# Patient Record
Sex: MALE | Race: WHITE | Employment: OTHER | URBAN - METROPOLITAN AREA
[De-identification: names, ages, dates, MRNs, and addresses within clinical notes are randomized per-mention and may not be internally consistent; named-entity substitution may affect disease eponyms.]

---

## 2017-05-07 ENCOUNTER — APPOINTMENT (OUTPATIENT)
Dept: CT IMAGING | Facility: HOSPITAL | Age: 62
End: 2017-05-07
Attending: EMERGENCY MEDICINE
Payer: COMMERCIAL

## 2017-05-07 ENCOUNTER — HOSPITAL ENCOUNTER (EMERGENCY)
Facility: HOSPITAL | Age: 62
Discharge: HOME | End: 2017-05-07
Attending: EMERGENCY MEDICINE
Payer: COMMERCIAL

## 2017-05-07 VITALS
OXYGEN SATURATION: 98 % | DIASTOLIC BLOOD PRESSURE: 66 MMHG | SYSTOLIC BLOOD PRESSURE: 121 MMHG | HEART RATE: 72 BPM | RESPIRATION RATE: 16 BRPM | WEIGHT: 165 LBS | TEMPERATURE: 98 F | HEIGHT: 66 IN | BODY MASS INDEX: 26.52 KG/M2

## 2017-05-07 DIAGNOSIS — K57.92 ACUTE DIVERTICULITIS: Primary | ICD-10-CM

## 2017-05-07 PROCEDURE — 96360 HYDRATION IV INFUSION INIT: CPT

## 2017-05-07 PROCEDURE — 85025 COMPLETE CBC W/AUTO DIFF WBC: CPT | Performed by: EMERGENCY MEDICINE

## 2017-05-07 PROCEDURE — 80053 COMPREHEN METABOLIC PANEL: CPT | Performed by: EMERGENCY MEDICINE

## 2017-05-07 PROCEDURE — 83690 ASSAY OF LIPASE: CPT | Performed by: EMERGENCY MEDICINE

## 2017-05-07 PROCEDURE — 74177 CT ABD & PELVIS W/CONTRAST: CPT | Performed by: EMERGENCY MEDICINE

## 2017-05-07 PROCEDURE — 96361 HYDRATE IV INFUSION ADD-ON: CPT

## 2017-05-07 PROCEDURE — 99284 EMERGENCY DEPT VISIT MOD MDM: CPT

## 2017-05-07 PROCEDURE — 81001 URINALYSIS AUTO W/SCOPE: CPT | Performed by: EMERGENCY MEDICINE

## 2017-05-07 RX ORDER — LEVOFLOXACIN 750 MG/1
750 TABLET ORAL ONCE
Status: COMPLETED | OUTPATIENT
Start: 2017-05-07 | End: 2017-05-07

## 2017-05-07 RX ORDER — IBUPROFEN 200 MG
200 TABLET ORAL EVERY 6 HOURS PRN
COMMUNITY
End: 2017-05-09

## 2017-05-07 RX ORDER — METRONIDAZOLE 500 MG/1
500 TABLET ORAL 3 TIMES DAILY
Qty: 30 TABLET | Refills: 0 | Status: SHIPPED | OUTPATIENT
Start: 2017-05-07 | End: 2017-05-17

## 2017-05-07 RX ORDER — SODIUM CHLORIDE 9 MG/ML
INJECTION, SOLUTION INTRAVENOUS CONTINUOUS
Status: DISCONTINUED | OUTPATIENT
Start: 2017-05-07 | End: 2017-05-07

## 2017-05-07 RX ORDER — LEVOFLOXACIN 500 MG/1
500 TABLET, FILM COATED ORAL DAILY
Qty: 10 TABLET | Refills: 0 | Status: SHIPPED | OUTPATIENT
Start: 2017-05-07 | End: 2017-05-17

## 2017-05-07 RX ORDER — CHLORAL HYDRATE 500 MG
1000 CAPSULE ORAL DAILY
COMMUNITY

## 2017-05-07 NOTE — ED PROVIDER NOTES
Patient Seen in: [facility] Emergency Department    History   No chief complaint on file. Stated Complaint: abd pain    HPI  Patient is a 77-year-old male who states that over the past 2 days he has had left lower quadrant abdominal pain.   Armani systems reviewed and negative except as noted above. PSFH elements reviewed from today and agreed except as otherwise stated in HPI.     Physical Exam     ED Triage Vitals   BP 05/07/17 1316 157/101 mmHg   Pulse 05/07/17 1316 73   Resp 05/07/17 1316 16 DIFFERENTIAL WITH PLATELET.   Procedure                               Abnormality         Status                     ---------                               -----------         ------                     CBC W/ DIFFERENTIAL[[id number]]          Abnormal

## 2017-05-07 NOTE — ED INITIAL ASSESSMENT (HPI)
Pt came from an Immediate care for further evaluation for abd pain that started 2 days ago. Jesus ferrell.

## 2017-05-07 NOTE — ED NOTES
Pt reevaluated by er physician, informed of all his test reports and plan of care, pt verbalizing understanding. For discharge.

## 2017-05-07 NOTE — PROGRESS NOTES
[facility] Note:  Renal Adjustment for Levaquin (levofloxacin)    David Vigil is a 59year old male who has been prescribed Levaquin (levofloxacin) 500 mg X 1 DOSE. CrCl is estimated creatinine clearance is 82.2 mL/min (based on Cr of 0.83).  so th

## 2017-05-09 PROBLEM — K57.32 DIVERTICULITIS OF LARGE INTESTINE WITHOUT PERFORATION OR ABSCESS WITHOUT BLEEDING: Status: ACTIVE | Noted: 2017-05-09

## 2017-05-10 PROCEDURE — 81001 URINALYSIS AUTO W/SCOPE: CPT | Performed by: INTERNAL MEDICINE

## 2017-05-17 ENCOUNTER — HOSPITAL ENCOUNTER (EMERGENCY)
Facility: HOSPITAL | Age: 62
Discharge: HOME | End: 2017-05-17
Attending: EMERGENCY MEDICINE
Payer: COMMERCIAL

## 2017-05-17 ENCOUNTER — APPOINTMENT (OUTPATIENT)
Dept: ULTRASOUND IMAGING | Facility: HOSPITAL | Age: 62
End: 2017-05-17
Attending: EMERGENCY MEDICINE
Payer: COMMERCIAL

## 2017-05-17 VITALS
RESPIRATION RATE: 16 BRPM | TEMPERATURE: 97 F | OXYGEN SATURATION: 100 % | HEART RATE: 62 BPM | DIASTOLIC BLOOD PRESSURE: 79 MMHG | SYSTOLIC BLOOD PRESSURE: 131 MMHG

## 2017-05-17 DIAGNOSIS — M79.604 PAIN OF RIGHT LOWER EXTREMITY: Primary | ICD-10-CM

## 2017-05-17 PROCEDURE — 99284 EMERGENCY DEPT VISIT MOD MDM: CPT

## 2017-05-17 PROCEDURE — 93971 EXTREMITY STUDY: CPT | Performed by: EMERGENCY MEDICINE

## 2017-05-17 NOTE — ED NOTES
Pt complaints of pain in the right foot for the past few days. Pt reports increase of pain in severity and location spreading up to the knee. Pt has some tingling to the dorsal area and the plantar area.   Pt has slight tenderness to the achilles area with

## 2017-05-18 NOTE — ED NOTES
Pt returned from [address], he is resting in bed. Family at bedside.  Pt in no acute distress

## 2017-05-18 NOTE — ED PROVIDER NOTES
Patient Seen in: [facility] Emergency Department    History   Patient presents with:  Deep Vein Thrombosis (cardiovascular)    Stated Complaint: swelling to leg and foot r/o dvt    HPI    Patient is a pleasant 57-year-old male presents the emergency stated in HPI.     Physical Exam     ED Triage Vitals   BP 05/17/17 1648 137/79 mmHg   Pulse 05/17/17 1648 68   Resp 05/17/17 1648 14   Temp 05/17/17 1648 96.9 °F (36.1 °C)   Temp src 05/17/17 1648 Oral   SpO2 05/17/17 1648 100 %   O2 Device 05/17/17 1648 N

## 2017-06-16 ENCOUNTER — HOSPITAL ENCOUNTER (OUTPATIENT)
Facility: HOSPITAL | Age: 62
Setting detail: OBSERVATION
Discharge: HOME | End: 2017-06-17
Attending: EMERGENCY MEDICINE | Admitting: HOSPITALIST
Payer: COMMERCIAL

## 2017-06-16 ENCOUNTER — APPOINTMENT (OUTPATIENT)
Dept: CT IMAGING | Facility: HOSPITAL | Age: 62
End: 2017-06-16
Attending: EMERGENCY MEDICINE
Payer: COMMERCIAL

## 2017-06-16 DIAGNOSIS — K57.92 ACUTE DIVERTICULITIS: Primary | ICD-10-CM

## 2017-06-16 PROBLEM — K63.89 EPIPLOIC APPENDAGITIS: Status: ACTIVE | Noted: 2017-06-16

## 2017-06-16 PROCEDURE — 99220 INITIAL OBSERVATION CARE,LEVL III: CPT | Performed by: HOSPITALIST

## 2017-06-16 PROCEDURE — 74177 CT ABD & PELVIS W/CONTRAST: CPT | Performed by: EMERGENCY MEDICINE

## 2017-06-16 RX ORDER — HYDROMORPHONE HYDROCHLORIDE 1 MG/ML
1 INJECTION, SOLUTION INTRAMUSCULAR; INTRAVENOUS; SUBCUTANEOUS EVERY 2 HOUR PRN
Status: DISCONTINUED | OUTPATIENT
Start: 2017-06-16 | End: 2017-06-17

## 2017-06-16 RX ORDER — ATORVASTATIN CALCIUM 10 MG/1
10 TABLET, FILM COATED ORAL NIGHTLY
Status: DISCONTINUED | OUTPATIENT
Start: 2017-06-16 | End: 2017-06-17

## 2017-06-16 RX ORDER — PANTOPRAZOLE SODIUM 20 MG/1
20 TABLET, DELAYED RELEASE ORAL
Status: DISCONTINUED | OUTPATIENT
Start: 2017-06-17 | End: 2017-06-17

## 2017-06-16 RX ORDER — MORPHINE SULFATE 4 MG/ML
4 INJECTION, SOLUTION INTRAMUSCULAR; INTRAVENOUS EVERY 2 HOUR PRN
Status: DISCONTINUED | OUTPATIENT
Start: 2017-06-16 | End: 2017-06-17

## 2017-06-16 RX ORDER — IBUPROFEN 200 MG
200 TABLET ORAL EVERY 4 HOURS PRN
Status: DISCONTINUED | OUTPATIENT
Start: 2017-06-16 | End: 2017-06-17

## 2017-06-16 RX ORDER — ENOXAPARIN SODIUM 100 MG/ML
40 INJECTION SUBCUTANEOUS NIGHTLY
Status: DISCONTINUED | OUTPATIENT
Start: 2017-06-17 | End: 2017-06-17

## 2017-06-16 RX ORDER — HYDROMORPHONE HYDROCHLORIDE 1 MG/ML
0.5 INJECTION, SOLUTION INTRAMUSCULAR; INTRAVENOUS; SUBCUTANEOUS EVERY 2 HOUR PRN
Status: DISCONTINUED | OUTPATIENT
Start: 2017-06-16 | End: 2017-06-17

## 2017-06-16 RX ORDER — IBUPROFEN 600 MG/1
600 TABLET ORAL EVERY 4 HOURS PRN
Status: DISCONTINUED | OUTPATIENT
Start: 2017-06-16 | End: 2017-06-17

## 2017-06-16 RX ORDER — ACETAMINOPHEN 325 MG/1
650 TABLET ORAL EVERY 6 HOURS PRN
Status: DISCONTINUED | OUTPATIENT
Start: 2017-06-16 | End: 2017-06-17

## 2017-06-16 RX ORDER — HYDROMORPHONE HYDROCHLORIDE 1 MG/ML
0.5 INJECTION, SOLUTION INTRAMUSCULAR; INTRAVENOUS; SUBCUTANEOUS EVERY 30 MIN PRN
Status: DISCONTINUED | OUTPATIENT
Start: 2017-06-16 | End: 2017-06-17

## 2017-06-16 RX ORDER — LOSARTAN POTASSIUM 50 MG/1
50 TABLET ORAL DAILY
Status: DISCONTINUED | OUTPATIENT
Start: 2017-06-16 | End: 2017-06-17

## 2017-06-16 RX ORDER — SODIUM CHLORIDE 9 MG/ML
INJECTION, SOLUTION INTRAVENOUS CONTINUOUS
Status: ACTIVE | OUTPATIENT
Start: 2017-06-16 | End: 2017-06-17

## 2017-06-16 RX ORDER — SODIUM CHLORIDE 9 MG/ML
INJECTION, SOLUTION INTRAVENOUS CONTINUOUS
Status: DISCONTINUED | OUTPATIENT
Start: 2017-06-16 | End: 2017-06-17

## 2017-06-16 RX ORDER — ONDANSETRON 2 MG/ML
4 INJECTION INTRAMUSCULAR; INTRAVENOUS ONCE
Status: COMPLETED | OUTPATIENT
Start: 2017-06-16 | End: 2017-06-16

## 2017-06-16 RX ORDER — ONDANSETRON 2 MG/ML
4 INJECTION INTRAMUSCULAR; INTRAVENOUS EVERY 4 HOURS PRN
Status: DISCONTINUED | OUTPATIENT
Start: 2017-06-16 | End: 2017-06-17

## 2017-06-16 RX ORDER — ONDANSETRON 2 MG/ML
4 INJECTION INTRAMUSCULAR; INTRAVENOUS EVERY 6 HOURS PRN
Status: DISCONTINUED | OUTPATIENT
Start: 2017-06-16 | End: 2017-06-17

## 2017-06-16 RX ORDER — IBUPROFEN 400 MG/1
400 TABLET ORAL EVERY 4 HOURS PRN
Status: DISCONTINUED | OUTPATIENT
Start: 2017-06-16 | End: 2017-06-17

## 2017-06-16 RX ORDER — MORPHINE SULFATE 2 MG/ML
1 INJECTION, SOLUTION INTRAMUSCULAR; INTRAVENOUS EVERY 2 HOUR PRN
Status: DISCONTINUED | OUTPATIENT
Start: 2017-06-16 | End: 2017-06-17

## 2017-06-16 RX ORDER — MORPHINE SULFATE 2 MG/ML
2 INJECTION, SOLUTION INTRAMUSCULAR; INTRAVENOUS EVERY 2 HOUR PRN
Status: DISCONTINUED | OUTPATIENT
Start: 2017-06-16 | End: 2017-06-17

## 2017-06-16 RX ORDER — SODIUM CHLORIDE 9 MG/ML
INJECTION, SOLUTION INTRAVENOUS ONCE
Status: COMPLETED | OUTPATIENT
Start: 2017-06-16 | End: 2017-06-17

## 2017-06-16 NOTE — ED PROVIDER NOTES
Patient Seen in: [facility] Emergency Department    History   Patient presents with:  Abdomen/Flank Pain (GI/)    Stated Complaint: diverticulitis     HPI    64-year-old male comes the hospital complaint of having difficulty with pain near his lef today and agreed except as otherwise stated in HPI.     Physical Exam       ED Triage Vitals   BP 06/16/17 1827 118/71 mmHg   Pulse 06/16/17 1827 90   Resp 06/16/17 1827 18   Temp 06/16/17 1827 98.6 °F (37 °C)   Temp src 06/16/17 1827 Temporal   SpO2 06/16/ colon more suggestive of epiploic appendigitis than acute diverticulitis. Correlate clinically.  No other specific diagnostic abnormalities demonstrated.           Dictated by: Guicho Ty MD on 6/16/2017 at 21:26       Approved by: Guicho Ty MD     WALL:  Normal.  No mass or hernia.    URINARY BLADDER:  Normal.  No visible focal wall thickening, lesion, or calculus.    PELVIC NODES:  Normal.  No adenopathy.    PELVIC ORGANS:  Normal.  No visible mass.  Pelvic organs appropriate for patient age.    [place]

## 2017-06-17 VITALS
HEART RATE: 57 BPM | DIASTOLIC BLOOD PRESSURE: 60 MMHG | RESPIRATION RATE: 18 BRPM | SYSTOLIC BLOOD PRESSURE: 107 MMHG | WEIGHT: 152.31 LBS | BODY MASS INDEX: 25 KG/M2 | OXYGEN SATURATION: 100 % | TEMPERATURE: 98 F

## 2017-06-17 PROCEDURE — 99217 OBSERVATION CARE DISCHARGE: CPT | Performed by: HOSPITALIST

## 2017-06-17 RX ORDER — POTASSIUM CHLORIDE 20 MEQ/1
40 TABLET, EXTENDED RELEASE ORAL ONCE
Status: COMPLETED | OUTPATIENT
Start: 2017-06-17 | End: 2017-06-17

## 2017-06-17 NOTE — H&P
[facility]  History and Physical     Kaylah Reis Patient Status:  Emergency    8/3/1955 MRN [id number]   Location [address] [facility] Attending Roseline Lund MD   Hosp Day # 0 PCP Liborio Thompson     Chief Complaint: Major Lord omega-3 fatty acids 1000 MG Oral Cap Take 1,000 mg by mouth daily.  Disp:  Rfl:    simvastatin 20 MG Oral Tab TK 1 T PO QPM BEFORE DINNER Disp:  Rfl: 0   omeprazole 20 MG Oral Capsule Delayed Release TK 1 C PO ONCE D Disp:  Rfl: 0   Losartan Potassium 50 empirically  2. CLD  3. Antiinflammatories and pain control, antiemetics  4. GI following  2. HTN, controlled, home meds resumed  3. DL, statin  4.  GERD, PPi      Quality:  · DVT Prophylaxis: lovenox  · CODE status: Full  · Cisse: no    Plan of care discus

## 2017-06-17 NOTE — CONSULTS
[id number] Patient Status:  Emergency    8/3/1955 MRN [id number]   Location [address] [facility] Attending Lindsay Gallagher MD   Hosp Day # 0 PCP Gaby Badillo is a 59year old male for LLQ Smoker                   Packs/Day: 0.00  Years:         Occupation: Retired   FAMILY HX: GI HX: None, no hx of colon cancer  No family history on file.      REVIEW OF SYSTEMS:   GEN: feels well otherwise, no F/C, no wt loss  SKIN: denies any unusual skin l

## 2017-06-17 NOTE — PLAN OF CARE
Patient c/o mild pain on left lower quadrant of abdomen. Patient's abdomen soft, non-distended, non-tender, bowel sounds hypoactive. Low residue diet tolerated well. Patient declined pain medication.

## 2017-06-17 NOTE — PLAN OF CARE
NURSING ADMISSION NOTE      Patient admitted via Cart  Oriented to room. Safety precautions initiated. Bed in low position. Call light in reach. Pt alert and oriented, able to participate in admission navigator.  Complains of llq  Pain, refusing pa

## 2017-06-17 NOTE — PROGRESS NOTES
[facility]  Progress Note    Kelton Kwon Patient Status:  Observation    8/3/1955 MRN [id number]   [facility] 4NW-A Attending Pranay Martinez MD   Hosp Day # 1 PCP Pamela Myers     Subjective:  Kelton Kwon is a(n) 59 year o

## 2017-06-18 NOTE — DISCHARGE SUMMARY
[facility]PITAL[facility]  DISCHARGE SUMMARY     Susanna Lopez Patient Status:  Observation    8/3/1955 MRN [id number]   [facility] 4NW-A Attending No att. providers found   [facility] Day # 1 PCP MAI Almanzar     Date of Admission: 2017  Date complete his recently started augmentin regimen.     He will continue to follow-up with his primary care physician and his gastroenterologist.    Consultants:  • GI      Discharge Medication List:     Discharge Medications      CONTINUE taking these medicat

## 2017-07-07 PROBLEM — A04.72 C. DIFFICILE COLITIS: Status: ACTIVE | Noted: 2017-07-07

## 2024-01-09 NOTE — ED INITIAL ASSESSMENT (HPI)
Preop Evaluation placed at [place]  for patient .  Ciera SIMS    [facility]     Pt to ED from home with c/o pain from right foot up through knee.

## (undated) NOTE — ED AVS SNAPSHOT
[facility] Emergency Department    [place]  [address] [address]    Phone:  [phone]    Fax:  [phone] [facility]   MRN: [id number]    Department:  [facility] Emergency Department   Date of Visit:  5/ IF THERE IS ANY CHANGE OR WORSENING OF YOUR CONDITION, CALL YOUR PRIMARY CARE PHYSICIAN AT ONCE OR RETURN IMMEDIATELY TO THE EMERGENCY DEPARTMENT.     If you have been prescribed any medication(s), please fill your prescription right away and begin taking t

## (undated) NOTE — IP AVS SNAPSHOT
[facility] [place] [address] [place], [address] ~ [phone]                Discharge Summary   6/16/2017    Shonda Francisco [address]           Admission Information        Provider Department    6/16/2017 Daniela Jose MD  4nw-A Discharge References/Attachments     AMOXICILLIN CAPSULES OR TABLETS (ENGLISH)      Follow-up Information     Follow up with Pamela Morales MD In 2 weeks.     Specialty:  GASTROENTEROLOGY    Contact information:    2 TRANS AM JUNE 0.03    (05/10/17)  72.0 (05/10/17)  16.7 (05/10/17)  9.2 (05/10/17)  1.5 (05/07/17)  0.6  (05/10/17)  6.51 (05/10/17)  1.51 (05/10/17)  0.83 (H) (05/10/17)  0.14 (05/10/17)  1.40      Metabolic Lab Results  (Last result in the past 90 days)    HgbA1C Gluc information, go to [email or web address] and click on the Sign Up Now link in the [employer] box. Enter your [employer] Activation Code exactly as it appears below along with your Zip Code and Date of Birth to complete the sign-up process.  If you do What to report to your healthcare team:  Dizziness, nausea, chest pain, weakness, numbness           Cholesterol Lowering Medications     simvastatin 20 MG Oral Tab       Use: Lower cholesterol, protect your heart   Most common side effects: Dizziness, con

## (undated) NOTE — ED AVS SNAPSHOT
[facility] Emergency Department    [place]  [address] [address]    Phone:  [phone]    Fax:  [phone]   MRN: [id number]    Department:  [facility] Emergency Department   Date of Visit:  5/ IF THERE IS ANY CHANGE OR WORSENING OF YOUR CONDITION, CALL YOUR PRIMARY CARE PHYSICIAN AT ONCE OR RETURN IMMEDIATELY TO THE EMERGENCY DEPARTMENT.     If you have been prescribed any medication(s), please fill your prescription right away and begin taking t

## (undated) NOTE — ED AVS SNAPSHOT
[facility] Emergency Department    [place]  [address] [address]    Phone:  [phone]    Fax:  [phone] [facility]   MRN: [id number]    Department:  [facility] Emergency Department   Date of Visit:  5/ self-assessment the day after your visit. You may also receive a call from our patient liason soon after your visit. Also, some patients receive a detailed feedback survey mailed to them a week after the visit.   If you receive this, we would really apprec [address] [phone] [place] [address] [facility] [id number] ([address]) [address] [place] [address] of the vascular structures, Doppler spectral analysis, and color flow. Doppler imaging were performed.   The   following veins were imaged:  Common, deep, and superficial femoral, popliteal, sapheno-femoral junction, posterior tibial veins, and the contral

## (undated) NOTE — ED AVS SNAPSHOT
[facility] Emergency Department    [place]  [address] [address]    Phone:  [phone]    Fax:  [phone]   MRN: [id number]    Department:  [facility] Emergency Department   Date of Visit:  5/ - metRONIDAZOLE 500 MG Tabs  - tramadol-acetaminophen 37.5-325 MG Tabs              Discharge Instructions       Follow-up for further evaluation with your primary physician or GI in the next 2-3 days.   Return if fever greater 100.3, worsening pain, new or primary care or specialist physician will see patients referred from the [facility] Emergency Department. Follow-up care is at the discretion of that Physician.     IF THERE IS ANY CHANGE OR WORSENING OF YOUR CONDITION, CALL YOUR PRIMARY CARE PHYSICIAN harming yourself, contact [address] [facility] at [phone]. - If you don’t have insurance, Willem Oh has partnered with Patient [address] to help you get signed up for insurance coverage.   Patient [place] RETROPERITONEUM:  Unremarkable.   BOWEL/MESENTERY:  Moderate short segment focal area of inflammatory change associated with a diverticulum along the distal descending colon with associated colonic wall thickening is compatible with an area of acute diverti